# Patient Record
Sex: FEMALE | Race: OTHER | HISPANIC OR LATINO | URBAN - METROPOLITAN AREA
[De-identification: names, ages, dates, MRNs, and addresses within clinical notes are randomized per-mention and may not be internally consistent; named-entity substitution may affect disease eponyms.]

---

## 2019-12-21 ENCOUNTER — INPATIENT (INPATIENT)
Facility: HOSPITAL | Age: 74
LOS: 1 days | Discharge: HOME | DRG: 305 | End: 2019-12-23
Attending: INTERNAL MEDICINE | Admitting: INTERNAL MEDICINE
Payer: MEDICARE

## 2019-12-21 VITALS
TEMPERATURE: 98 F | RESPIRATION RATE: 20 BRPM | DIASTOLIC BLOOD PRESSURE: 101 MMHG | OXYGEN SATURATION: 97 % | HEART RATE: 104 BPM | SYSTOLIC BLOOD PRESSURE: 194 MMHG | HEIGHT: 63 IN | WEIGHT: 156.97 LBS

## 2019-12-21 PROCEDURE — 99285 EMERGENCY DEPT VISIT HI MDM: CPT

## 2019-12-21 PROCEDURE — 99053 MED SERV 10PM-8AM 24 HR FAC: CPT

## 2019-12-21 PROCEDURE — 93010 ELECTROCARDIOGRAM REPORT: CPT

## 2019-12-21 NOTE — ED ADULT TRIAGE NOTE - CHIEF COMPLAINT QUOTE
patient states that he blood pressure is high with HA and palpitations denies CP but feeling anxious

## 2019-12-22 DIAGNOSIS — I10 ESSENTIAL (PRIMARY) HYPERTENSION: ICD-10-CM

## 2019-12-22 DIAGNOSIS — R94.31 ABNORMAL ELECTROCARDIOGRAM [ECG] [EKG]: ICD-10-CM

## 2019-12-22 DIAGNOSIS — R00.2 PALPITATIONS: ICD-10-CM

## 2019-12-22 LAB
ALBUMIN SERPL ELPH-MCNC: 4.3 G/DL — SIGNIFICANT CHANGE UP (ref 3.3–5.2)
ALP SERPL-CCNC: 106 U/L — SIGNIFICANT CHANGE UP (ref 40–120)
ALT FLD-CCNC: 16 U/L — SIGNIFICANT CHANGE UP
ANION GAP SERPL CALC-SCNC: 14 MMOL/L — SIGNIFICANT CHANGE UP (ref 5–17)
APPEARANCE UR: CLEAR — SIGNIFICANT CHANGE UP
APTT BLD: 34.3 SEC — SIGNIFICANT CHANGE UP (ref 27.5–36.3)
AST SERPL-CCNC: 24 U/L — SIGNIFICANT CHANGE UP
BACTERIA # UR AUTO: NEGATIVE — SIGNIFICANT CHANGE UP
BILIRUB SERPL-MCNC: 0.3 MG/DL — LOW (ref 0.4–2)
BILIRUB UR-MCNC: NEGATIVE — SIGNIFICANT CHANGE UP
BUN SERPL-MCNC: 13 MG/DL — SIGNIFICANT CHANGE UP (ref 8–20)
CALCIUM SERPL-MCNC: 9.1 MG/DL — SIGNIFICANT CHANGE UP (ref 8.6–10.2)
CHLORIDE SERPL-SCNC: 105 MMOL/L — SIGNIFICANT CHANGE UP (ref 98–107)
CK MB CFR SERPL CALC: 2.1 NG/ML — SIGNIFICANT CHANGE UP (ref 0–6.7)
CK SERPL-CCNC: 125 U/L — SIGNIFICANT CHANGE UP (ref 25–170)
CK SERPL-CCNC: 157 U/L — SIGNIFICANT CHANGE UP (ref 25–170)
CK SERPL-CCNC: 166 U/L — SIGNIFICANT CHANGE UP (ref 25–170)
CO2 SERPL-SCNC: 23 MMOL/L — SIGNIFICANT CHANGE UP (ref 22–29)
COLOR SPEC: YELLOW — SIGNIFICANT CHANGE UP
CREAT SERPL-MCNC: 0.75 MG/DL — SIGNIFICANT CHANGE UP (ref 0.5–1.3)
DIFF PNL FLD: ABNORMAL
EPI CELLS # UR: SIGNIFICANT CHANGE UP
GLUCOSE SERPL-MCNC: 149 MG/DL — HIGH (ref 70–115)
GLUCOSE UR QL: NEGATIVE MG/DL — SIGNIFICANT CHANGE UP
HCT VFR BLD CALC: 42.8 % — SIGNIFICANT CHANGE UP (ref 34.5–45)
HGB BLD-MCNC: 13.2 G/DL — SIGNIFICANT CHANGE UP (ref 11.5–15.5)
INR BLD: 1.08 RATIO — SIGNIFICANT CHANGE UP (ref 0.88–1.16)
KETONES UR-MCNC: NEGATIVE — SIGNIFICANT CHANGE UP
LEUKOCYTE ESTERASE UR-ACNC: NEGATIVE — SIGNIFICANT CHANGE UP
LIDOCAIN IGE QN: 22 U/L — SIGNIFICANT CHANGE UP (ref 22–51)
MAGNESIUM SERPL-MCNC: 2.2 MG/DL — SIGNIFICANT CHANGE UP (ref 1.6–2.6)
MAGNESIUM SERPL-MCNC: 2.2 MG/DL — SIGNIFICANT CHANGE UP (ref 1.6–2.6)
MCHC RBC-ENTMCNC: 25.6 PG — LOW (ref 27–34)
MCHC RBC-ENTMCNC: 30.8 GM/DL — LOW (ref 32–36)
MCV RBC AUTO: 83.1 FL — SIGNIFICANT CHANGE UP (ref 80–100)
NITRITE UR-MCNC: NEGATIVE — SIGNIFICANT CHANGE UP
NT-PROBNP SERPL-SCNC: 50 PG/ML — SIGNIFICANT CHANGE UP (ref 0–300)
PH UR: 8 — SIGNIFICANT CHANGE UP (ref 5–8)
PHOSPHATE SERPL-MCNC: 2.2 MG/DL — LOW (ref 2.4–4.7)
PLATELET # BLD AUTO: 214 K/UL — SIGNIFICANT CHANGE UP (ref 150–400)
POTASSIUM SERPL-MCNC: 3.6 MMOL/L — SIGNIFICANT CHANGE UP (ref 3.5–5.3)
POTASSIUM SERPL-SCNC: 3.6 MMOL/L — SIGNIFICANT CHANGE UP (ref 3.5–5.3)
PROT SERPL-MCNC: 7.6 G/DL — SIGNIFICANT CHANGE UP (ref 6.6–8.7)
PROT UR-MCNC: 15 MG/DL
PROTHROM AB SERPL-ACNC: 12.5 SEC — SIGNIFICANT CHANGE UP (ref 10–12.9)
RBC # BLD: 5.15 M/UL — SIGNIFICANT CHANGE UP (ref 3.8–5.2)
RBC # FLD: 14.7 % — HIGH (ref 10.3–14.5)
RBC CASTS # UR COMP ASSIST: SIGNIFICANT CHANGE UP /HPF (ref 0–4)
SODIUM SERPL-SCNC: 142 MMOL/L — SIGNIFICANT CHANGE UP (ref 135–145)
SP GR SPEC: 1.01 — SIGNIFICANT CHANGE UP (ref 1.01–1.02)
TROPONIN T SERPL-MCNC: <0.01 NG/ML — SIGNIFICANT CHANGE UP (ref 0–0.06)
UROBILINOGEN FLD QL: NEGATIVE MG/DL — SIGNIFICANT CHANGE UP
WBC # BLD: 8.17 K/UL — SIGNIFICANT CHANGE UP (ref 3.8–10.5)
WBC # FLD AUTO: 8.17 K/UL — SIGNIFICANT CHANGE UP (ref 3.8–10.5)
WBC UR QL: SIGNIFICANT CHANGE UP

## 2019-12-22 PROCEDURE — 99223 1ST HOSP IP/OBS HIGH 75: CPT

## 2019-12-22 PROCEDURE — 93010 ELECTROCARDIOGRAM REPORT: CPT

## 2019-12-22 PROCEDURE — 71045 X-RAY EXAM CHEST 1 VIEW: CPT | Mod: 26

## 2019-12-22 RX ORDER — ACETAMINOPHEN 500 MG
650 TABLET ORAL EVERY 6 HOURS
Refills: 0 | Status: DISCONTINUED | OUTPATIENT
Start: 2019-12-22 | End: 2019-12-23

## 2019-12-22 RX ORDER — NITROGLYCERIN 6.5 MG
0.4 CAPSULE, EXTENDED RELEASE ORAL
Refills: 0 | Status: DISCONTINUED | OUTPATIENT
Start: 2019-12-22 | End: 2019-12-23

## 2019-12-22 RX ORDER — LOSARTAN POTASSIUM 100 MG/1
1 TABLET, FILM COATED ORAL
Qty: 0 | Refills: 0 | DISCHARGE

## 2019-12-22 RX ORDER — PANTOPRAZOLE SODIUM 20 MG/1
40 TABLET, DELAYED RELEASE ORAL
Refills: 0 | Status: DISCONTINUED | OUTPATIENT
Start: 2019-12-22 | End: 2019-12-23

## 2019-12-22 RX ORDER — ALPRAZOLAM 0.25 MG
0.5 TABLET ORAL DAILY
Refills: 0 | Status: DISCONTINUED | OUTPATIENT
Start: 2019-12-22 | End: 2019-12-23

## 2019-12-22 RX ORDER — LOSARTAN POTASSIUM 100 MG/1
50 TABLET, FILM COATED ORAL DAILY
Refills: 0 | Status: DISCONTINUED | OUTPATIENT
Start: 2019-12-22 | End: 2019-12-23

## 2019-12-22 RX ORDER — RANITIDINE HYDROCHLORIDE 150 MG/1
1 TABLET, FILM COATED ORAL
Qty: 0 | Refills: 0 | DISCHARGE

## 2019-12-22 RX ORDER — METOPROLOL TARTRATE 50 MG
5 TABLET ORAL ONCE
Refills: 0 | Status: COMPLETED | OUTPATIENT
Start: 2019-12-22 | End: 2019-12-22

## 2019-12-22 RX ADMIN — Medication 650 MILLIGRAM(S): at 23:08

## 2019-12-22 RX ADMIN — Medication 5 MILLIGRAM(S): at 02:53

## 2019-12-22 RX ADMIN — LOSARTAN POTASSIUM 50 MILLIGRAM(S): 100 TABLET, FILM COATED ORAL at 10:04

## 2019-12-22 RX ADMIN — PANTOPRAZOLE SODIUM 40 MILLIGRAM(S): 20 TABLET, DELAYED RELEASE ORAL at 12:28

## 2019-12-22 RX ADMIN — Medication 0.5 MILLIGRAM(S): at 10:04

## 2019-12-22 RX ADMIN — Medication 650 MILLIGRAM(S): at 10:04

## 2019-12-22 NOTE — ED ADULT NURSE NOTE - NSIMPLEMENTINTERV_GEN_ALL_ED
Implemented All Universal Safety Interventions:  [place] to call system. Call bell, personal items and telephone within reach. Instruct patient to call for assistance. Room bathroom lighting operational. Non-slip footwear when patient is off stretcher. Physically safe environment: no spills, clutter or unnecessary equipment. Stretcher in lowest position, wheels locked, appropriate side rails in place.

## 2019-12-22 NOTE — ED PROVIDER NOTE - OBJECTIVE STATEMENT
75 y/o female with PMHx of HTN, and Neuropathy, presents to ED, c/o hypertension. Patient states she was at her sisters house, her niece checked her blood pressure, found it to be high, called daughter and was brought to ED. Patients  in [place] says patient has emotional high blood pressure. Pt is feeling ok right now. Patient has [place] in the [place] now, has a bottle of 38 pills with her. Daughter says she believes patient is not taking the pills everyday.     Patient takes HTN medications everyday in the morning. States she currently has a headache, and has also been experiencing heartburn 1 hour after eating meals. Sometimes pt has palpitations, also has gained about 20 pounds recently. Daughter states patient's  passed away from Leukemia, and grandson was killed.         Denies chest pain, nausea, vomiting, stomach pain, fever, chills, SOB on exertion, 75 y/o female with PMHx of HTN, and Neuropathy, presents to ED, c/o hypertension. Patient states she was at her sisters house, her niece checked her blood pressure, found it to be high, called daughter and was brought to ED. Patients  in [place] says patient has emotional high blood pressure. Pt is feeling ok right now. Patient has been [place] in the [place] for 4 [place] now, has a bottle of 37 pills with her, that was a 90 day prescription. Daughter says she believes patient is not taking the pills everyday.     Patient takes HTN medications everyday in the morning. States she currently has a headache, and has also been experiencing heartburn 1 hour after eating meals. Sometimes pt has palpitations, also has gained about 20 pounds recently. Daughter states patient's  passed away from Leukemia, and grandson was killed.     Denies chest pain, nausea, vomiting, stomach pain, fever, chills, SOB on exertion, 75 y/o female with PMHx of HTN, and Neuropathy, presents to ED, c/o hypertension, headache, and palpitations which began earlier today.   Patient states that she was at her sisters house, when her niece checked her blood pressure, found it to be high, called pt's daughter, and had pt brought to the ED. Patients PCP in [place] told patient that she has emotional high blood pressure. Patient has been visiting the [place] for 4 [place] now, has a bottle of 37 pills with her, that was originally a 90 day prescription. Daughter believes patient has not been taking her pills everyday.   Patient is meant to be taking her HTN medications every morning. She also c/o newly onset intermittent heartburn, which occurs 1 hour consistently after eating. Pt has gained about 20 pounds recently. Daughter states patient's  passed away from Leukemia, and that her grandson was killed, and that these events were precursors to her diagnosis of hypertension. Denies chest pain, nausea, vomiting, stomach pain, fever, chills, SOB on exertion, dysuria, hematuria.

## 2019-12-22 NOTE — CONSULT NOTE ADULT - ASSESSMENT
73 y/o F with PMH HTN, Reflux, anxiety presents to ED with c/o high blood pressure. Daughter at bedside, pt was at sisters house, began feeling palpiatitons and headache, checked blood pressure was elevated, called daughter and came to ED. Pt non adherent with anti-hypertensives, takes 1/2 pill occasionally. Takes xanax 1mg "every other day", sometimes half pill. Also admits to epigastric pain, burning after eating, relieved with water, non radiating, lasting 4-5 minutes. States had endoscopy few years ago, no ulcers seen, told to change diet.

## 2019-12-22 NOTE — CONSULT NOTE ADULT - ATTENDING COMMENTS
Pt is seen, examined, chart reviewed, d/w NP/PA.    73 y/o F with PMH HTN, Reflux, anxiety presents to ED with c/o high blood pressure. Daughter at bedside, pt was at sisters house, began feeling palpitations and headache, checked blood pressure was elevated, called daughter and came to ED. Pt non adherent with anti-hypertensives, takes 1/2 pill occasionally. Takes xanax 1mg "every other day", sometimes half pill. Also admits to epigastric pain, burning after eating, relieved with water, non radiating, lasting 4-5 minutes. States had endoscopy few years ago, no ulcers seen, told to change diet.     Abnormal EKG.   Prolonged QT  Echo  NST in am  NPO after midnight    Hypertension, unspecified type   losartan 50mg daily     Palpitations  telemetry- SR, no ectopy, no arrythmia  continue to monitor.

## 2019-12-22 NOTE — ED ADULT NURSE NOTE - EXTENSIONS OF SELF_ADULT
I reviewed the H&P, I examined the patient, and the following change is made to the patient's condition:    Patient reports poor handgrip strength and inability to grasp objects. Muscle strength appears symmetric.        Roddy Ramirez MD  
None

## 2019-12-22 NOTE — H&P ADULT - ASSESSMENT
The patient is a 74 year old female with a history of hypertension, GERD and anxiety who presented to the Er with complaints of headache and palpitations. According to the patient she is visiting her daughters from [place] for the past 4 months and ran out of her medications. Yesterday she had symptoms of headache, palpitations and epigastric discomfort. She checked her blood pressure and it was elevated therefore presented to the Er for evaluation. In the Er, BP of 194/101mmHg with a a HR of 104. Improved with IV lopressor 5mg x 1. Cardiac enzymes were negative, EKG with prolonged QT interval    Assessment/Plan:    1. Chest pain/Epigastric pain: Rule out ACS  Echocardiogram  Serial enzymes negative  NST ordered  Continue aspirin  Check lipid panel    2. Hypertensive urgency: Resume losartan  Monitor BP    3. GERD Protonix po    4. Anxiety: xanax as needed  Monitor QT interval

## 2019-12-22 NOTE — H&P ADULT - HISTORY OF PRESENT ILLNESS
The patient is a 74 year old female with a history of hypertension, GERD and anxiety who presented to the Er with complaints of headache and palpitations. According to the patient she is visiting her daughters from [place] for the past 4 months and ran out of her medications. Yesterday she had symptoms of headache, palpitations and epigastric discomfort. She checked her blood pressure and it was elevated therefore presented to the Er for evaluation. In the Er, BP of 194/101mmHg with a a HR of 104. Improved with IV lopressor 5mg x 1. Cardiac enzymes were negative, EKG with prolonged QT interval. At the time of my examination, has complaints of headache.

## 2019-12-22 NOTE — CONSULT NOTE ADULT - SUBJECTIVE AND OBJECTIVE BOX
[place] [facility]                                                               Office:  [address] [address]                                                              Telephone: [phone]. Fax:[phone]                                                                        CARDIOLOGY CONSULTATION NOTE                                                                                             Consult requested by: Dr. Oh  Reason for Consultation: Abnl EKG  History obtained by: Patient and medical record   obtained: No    Chief complaint:    Patient is a 74y old  Female who presents with a chief complaint of High blood pressure     HPI: 73 y/o F with PMH HTN, Reflux, anxiety presents to ED with c/o high blood pressure. Daughter at bedside, pt was at sisters house, began feeling palpiatitons and headache, checked blood pressure was elevated, called daughter and came to ED. Pt non adherent with anti-hypertensives, takes 1/2 pill occasionally. Takes xanax 1mg "every other day", sometimes half pill. Also admits to epigastric pain, burning after eating, relieved with water, non radiating, lasting 4-5 minutes. States had endoscopy few years ago, no ulcers seen, told to change diet. EKG noted to have prolonged QT in ED. Denies fever, chills, cough, phlegm production, shortness of breath, dyspnea on exertion, orthopnea, PND, edema, chest pain, pressure, irregular, fast heart beat, nausea, vomiting, melena, rectal bleed, hematuria, lightheadedness, dizziness, syncope, near syncope.        REVIEW OF SYMPTOMS:   CONSTITUTIONAL: No fever, weight loss, or fatigue  ENMT:  No difficulty hearing, tinnitus, vertigo; No sinus or throat pain  NECK: No pain or stiffness  CARDIOVASCULAR: No chest pain, dyspnea, syncope, palpitations, dizziness, Orthopnea, Paroxsymal nocturnal dyspnea  RESPIRATORY: No Dyspnea on exertion, Shortness of breath, cough, wheezing  : No dysuria, no hematuria   GI: +epigastric pain; No dark color stool, no melena, no diarrhea, no constipation, no abdominal pain   NEURO: No headache, no dizziness, no slurred speech   MUSCULOSKELETAL: No joint pain or swelling; No muscle, back, or extremity pain  PSYCH: No agitation, no anxiety.    ALL OTHER REVIEW OF SYSTEMS ARE NEGATIVE.        ALLERGIES: Allergies  penicillin (Hives)  Intolerances        PAST MEDICAL HISTORY  HTN    PAST SURGICAL HISTORY      FAMILY HISTORY:  Denies significant family medical history    SOCIAL HISTORY:  Denies smoking/alcohol/drugs      CURRENT MEDICATIONS:       HOME MEDICATIONS:  losartan  xanax  zantac    Vital Signs Last 24 Hrs  T(C): 36.9 (22 Dec 2019 04:06), Max: 36.9 (22 Dec 2019 04:06)  T(F): 98.4 (22 Dec 2019 04:06), Max: 98.4 (22 Dec 2019 04:06)  HR: 80 (22 Dec 2019 07:37) (80 - 104)  BP: 125/77 (22 Dec 2019 07:37) (125/77 - 194/101)  RR: 20 (22 Dec 2019 07:37) (19 - 20)  SpO2: 99% (22 Dec 2019 07:37) (97% - 99%)      PHYSICAL EXAM:  Constitutional: Comfortable . No acute distress.   HEENT: Atraumatic and normocephalic , neck is supple . no JVD. No carotid bruit. PEERL   CNS: A&Ox3. No focal deficits. EOMI. Cranial nerves II-IX are intact.   Lymph Nodes: Cervical : Not palpable.  Respiratory: CTAB  Cardiovascular: S1S2 RRR. No murmur/rubs or gallop.  Gastrointestinal: Soft non-tender and non distended . +Bowel sounds. negative Marin's sign.  Extremities: No edema.   Psychiatric: Calm . no agitation.  Skin: No skin rash/ulcers visualized to face, hands or feet.        LABS:                        13.2   8.17  )-----------( 214      ( 22 Dec 2019 00:46 )             42.8     12-    142  |  105  |  13.0  ----------------------------<  149<H>  3.6   |  23.0  |  0.75    Ca    9.1      22 Dec 2019 00:46  Phos  2.2     12-  Mg     2.2     12-    TPro  7.6  /  Alb  4.3  /  TBili  0.3<L>  /  DBili  x   /  AST  24  /  ALT  16  /  AlkPhos  106  12-22    CARDIAC MARKERS ( 22 Dec 2019 06:48 )  x     / <0.01 ng/mL / 166 U/L / x     / 2.1 ng/mL  CARDIAC MARKERS ( 22 Dec 2019 00:46 )  x     / <0.01 ng/mL / x     / x     / x        ;p-BNP=Serum Pro-Brain Natriuretic Peptide: 50 pg/mL ( @ 00:46)  PT/INR - ( 22 Dec 2019 00:46 )   PT: 12.5 sec;   INR: 1.08 ratio    PTT - ( 22 Dec 2019 00:46 )  PTT:34.3 sec    Urinalysis Basic - ( 22 Dec 2019 04:11 )    Color: Yellow / Appearance: Clear / S.010 / pH: x  Gluc: x / Ketone: Negative  / Bili: Negative / Urobili: Negative mg/dL   Blood: x / Protein: 15 mg/dL / Nitrite: Negative   Leuk Esterase: Negative / RBC: 0-2 /HPF / WBC 0-2   Sq Epi: x / Non Sq Epi: Occasional / Bacteria: Negative        INTERPRETATION OF TELEMETRY: Reviewed by me.   ECG: Reviewed by me.     RADIOLOGY & ADDITIONAL STUDIES:    X-ray:  reviewed by me.   CT scan:   MRI:

## 2019-12-22 NOTE — ED PROVIDER NOTE - NS ED ROS FT
no weight change, no fever or chills  no recent travel, no recent abox, no sick contacts  no recent change in medications  no rash, no bruises  no visual changes no eye discharge  no cough cold or congestion,   no sob, no chest pain  follows with cardiology  no stress test, no cath  no orthopnea, no pnd  no abd pain, no n/v/d  no endoscopy, no colonoscopy  no hematuria, no change in urinary habits  no joint pain, no deformity  no headache, no paresthesia no weight change, no fever or chills  no recent travel, no recent abox, no sick contacts  Non compliant with HTN meds  no rash, no bruises  no visual changes no eye discharge  no cough cold or congestion,   no sob, no chest pain  no abd pain, no n/v/d  no hematuria, no change in urinary habits  no joint pain, no deformity  no headache, no paresthesia no weight change, no fever or chills  no recent abox, no sick contacts  Non compliant with HTN meds  no rash, no bruises  no visual changes no eye discharge  no cough cold or congestion,   no sob, no chest pain  + palpitations  no abd pain, no n/v/d  no hematuria, no change in urinary habits  no joint pain, no deformity  + headache, no paresthesia

## 2019-12-22 NOTE — ED PROVIDER NOTE - PHYSICAL EXAMINATION
Constitutional : Appears comfortably, talking in full sentences  Head :NC AT , no swelling  Eyes :eomi, no swelling  Mouth :mm moist,  Neck : supple, trachea in midline  Chest :Herb air entry, symm chest expansion, no distress  Heart :S1 S2 distant  Abdomen :abd soft, non tender  Musc/Skel :ext no swelling, no deformity, no spine tenderness, distal pulses present  Neuro  :AAO 3 no focal deficits Constitutional : Appears comfortably, talking softly in full sentences  Head :NC AT , no swelling  Eyes :eomi, no swelling  Mouth :mm moist,  Neck : supple, trachea in midline  Chest :Herb air entry, symm chest expansion, no distress  Heart :S1 S2 distant  Abdomen :abd soft, non tender  Musc/Skel :ext no swelling, no deformity, no spine tenderness, distal pulses present  Neuro  :AAO 3 no focal deficits

## 2019-12-22 NOTE — ED PROVIDER NOTE - CLINICAL SUMMARY MEDICAL DECISION MAKING FREE TEXT BOX
75 y/o female with Hx of HTN, non compliant with her medications. Pt has 37 pills in bottle of medications which was prescribed for 90 pills. Plan: Check serial troponin, Cardiology consult, Continue telemetry monitor and Re-eval. 75 y/o female with Hx of HTN, non compliant with her medications. Pt has 37 pills in a bottle of medications which was originally prescribed with 90 pills. Plan to check serial troponins, cardiology consult, continue telemetry monitoring and reevaluate. 75 y/o female with Hx of HTN, non compliant with her medications. Pt has 37 pills in a bottle of medications which was originally prescribed with 90 pills. Plan to check serial troponin, cardiology consult, continue telemetry monitoring and reevaluate.

## 2019-12-22 NOTE — CONSULT NOTE ADULT - PROBLEM SELECTOR RECOMMENDATION 9
- Prolonged QT  - non cardiac symptoms   - METS >4  - TTE pending  - Troponin neg x 2, CK/CKMB WNL  - Nuclear stress test tomorrow- NPO after midnight  - continue telemetry monitor

## 2019-12-22 NOTE — ED PROVIDER NOTE - CHPI ED SYMPTOMS NEG
no pain/no vomiting/no chills no shortness of breath/no vomiting/no syncope/no cough/no chills/no back pain/no chest pain

## 2019-12-23 VITALS — OXYGEN SATURATION: 100 % | RESPIRATION RATE: 18 BRPM | HEART RATE: 70 BPM

## 2019-12-23 LAB
ANION GAP SERPL CALC-SCNC: 16 MMOL/L — SIGNIFICANT CHANGE UP (ref 5–17)
BUN SERPL-MCNC: 20 MG/DL — SIGNIFICANT CHANGE UP (ref 8–20)
CALCIUM SERPL-MCNC: 9.1 MG/DL — SIGNIFICANT CHANGE UP (ref 8.6–10.2)
CHLORIDE SERPL-SCNC: 106 MMOL/L — SIGNIFICANT CHANGE UP (ref 98–107)
CHOLEST SERPL-MCNC: 184 MG/DL — SIGNIFICANT CHANGE UP (ref 110–199)
CO2 SERPL-SCNC: 22 MMOL/L — SIGNIFICANT CHANGE UP (ref 22–29)
CREAT SERPL-MCNC: 0.86 MG/DL — SIGNIFICANT CHANGE UP (ref 0.5–1.3)
GLUCOSE SERPL-MCNC: 92 MG/DL — SIGNIFICANT CHANGE UP (ref 70–115)
HCT VFR BLD CALC: 43.5 % — SIGNIFICANT CHANGE UP (ref 34.5–45)
HCV AB S/CO SERPL IA: 0.09 S/CO — SIGNIFICANT CHANGE UP (ref 0–0.99)
HCV AB SERPL-IMP: SIGNIFICANT CHANGE UP
HDLC SERPL-MCNC: 49 MG/DL — LOW
HGB BLD-MCNC: 13.8 G/DL — SIGNIFICANT CHANGE UP (ref 11.5–15.5)
LIPID PNL WITH DIRECT LDL SERPL: 119 MG/DL — SIGNIFICANT CHANGE UP
MCHC RBC-ENTMCNC: 26 PG — LOW (ref 27–34)
MCHC RBC-ENTMCNC: 31.7 GM/DL — LOW (ref 32–36)
MCV RBC AUTO: 82.1 FL — SIGNIFICANT CHANGE UP (ref 80–100)
PLATELET # BLD AUTO: 215 K/UL — SIGNIFICANT CHANGE UP (ref 150–400)
POTASSIUM SERPL-MCNC: 3.5 MMOL/L — SIGNIFICANT CHANGE UP (ref 3.5–5.3)
POTASSIUM SERPL-SCNC: 3.5 MMOL/L — SIGNIFICANT CHANGE UP (ref 3.5–5.3)
RBC # BLD: 5.3 M/UL — HIGH (ref 3.8–5.2)
RBC # FLD: 15 % — HIGH (ref 10.3–14.5)
SODIUM SERPL-SCNC: 144 MMOL/L — SIGNIFICANT CHANGE UP (ref 135–145)
TOTAL CHOLESTEROL/HDL RATIO MEASUREMENT: 4 RATIO — SIGNIFICANT CHANGE UP (ref 3.3–7.1)
TRIGL SERPL-MCNC: 78 MG/DL — SIGNIFICANT CHANGE UP (ref 10–200)
WBC # BLD: 7.42 K/UL — SIGNIFICANT CHANGE UP (ref 3.8–10.5)
WBC # FLD AUTO: 7.42 K/UL — SIGNIFICANT CHANGE UP (ref 3.8–10.5)

## 2019-12-23 PROCEDURE — 78452 HT MUSCLE IMAGE SPECT MULT: CPT | Mod: 26

## 2019-12-23 PROCEDURE — 99238 HOSP IP/OBS DSCHRG MGMT 30/<: CPT

## 2019-12-23 PROCEDURE — 93016 CV STRESS TEST SUPVJ ONLY: CPT

## 2019-12-23 PROCEDURE — 99232 SBSQ HOSP IP/OBS MODERATE 35: CPT

## 2019-12-23 PROCEDURE — 93018 CV STRESS TEST I&R ONLY: CPT

## 2019-12-23 RX ORDER — LOSARTAN POTASSIUM 100 MG/1
100 TABLET, FILM COATED ORAL DAILY
Refills: 0 | Status: DISCONTINUED | OUTPATIENT
Start: 2019-12-23 | End: 2019-12-23

## 2019-12-23 RX ORDER — ALPRAZOLAM 0.25 MG
1 TABLET ORAL
Qty: 0 | Refills: 0 | DISCHARGE

## 2019-12-23 RX ADMIN — LOSARTAN POTASSIUM 50 MILLIGRAM(S): 100 TABLET, FILM COATED ORAL at 05:49

## 2019-12-23 RX ADMIN — Medication 0.5 MILLIGRAM(S): at 00:05

## 2019-12-23 RX ADMIN — PANTOPRAZOLE SODIUM 40 MILLIGRAM(S): 20 TABLET, DELAYED RELEASE ORAL at 05:49

## 2019-12-23 RX ADMIN — Medication 650 MILLIGRAM(S): at 00:07

## 2019-12-23 NOTE — PROGRESS NOTE ADULT - ASSESSMENT
75 y/o F with PMH HTN, Reflux, anxiety presents to ED with c/o high blood pressure. Daughter at bedside, pt was at sisters house, began feeling palpitations and headache, checked blood pressure was elevated, called daughter and came to ED. Pt non adherent with anti-hypertensives, takes 1/2 pill occasionally. Takes xanax 1mg "every other day", sometimes half pill. Also admits to epigastric pain, burning after eating, relieved with water, non radiating, lasting 4-5 minutes.     Abnormal EKG.   Echo as above  NST today: If normal outpt cardiology FU     Hypertension, unspecified type  Monitor BP  Cont meds, advance as needed

## 2019-12-23 NOTE — DISCHARGE NOTE PROVIDER - CARE PROVIDER_API CALL
[facility],   [facility], [place] [address] [phone]  Phone: (   )    -  Fax: (   )    -  Follow Up Time:

## 2019-12-23 NOTE — DISCHARGE NOTE PROVIDER - NSDCMRMEDTOKEN_GEN_ALL_CORE_FT
losartan 100 mg oral tablet: 1 tab(s) orally once a day  Xanax 0.5 mg oral tablet: 1 tab(s) orally once a day, As Needed  Zantac 150 oral tablet: 1 tab(s) orally 2 times a day losartan 100 mg oral tablet: 1 tab(s) orally once a day  Zantac 150 oral tablet: 1 tab(s) orally 2 times a day

## 2019-12-23 NOTE — DISCHARGE NOTE NURSING/CASE MANAGEMENT/SOCIAL WORK - PATIENT PORTAL LINK FT
You can access the FollowMyHealth Patient Portal offered by [facility] by registering at the following website: [email or web address]. By joining [employer]’s FollowMyHealth portal, you will also be able to view your health information using other applications (apps) compatible with our system.

## 2019-12-23 NOTE — DISCHARGE NOTE PROVIDER - PROVIDER TOKENS
FREE:[LAST:[[facility] clinic],PHONE:[(   )    -],FAX:[(   )    -],ADDRESS:[[facility] [address], [place] [phone]]]

## 2019-12-23 NOTE — PROGRESS NOTE ADULT - SUBJECTIVE AND OBJECTIVE BOX
CARDIOLOGY PROGRESS NOTE   ([facility])                                                                                                        Subjective: no new c/o, nad, denied dyspnea, cp    Vitals:  T(C): 36.4 (19 @ 05:45), Max: 36.8 (19 @ 21:26)  HR: 75 (19 @ 08:00) (75 - 83)  BP: 148/78 (19 @ 08:00) (147/84 - 170/86)  RR: 18 (19 @ 08:00) (17 - 18)  SpO2: 95% (19 @ 08:00) (93% - 97%)  Wt(kg): --  I&O's Summary    Height (cm): 157.5 ( @ 23:00)  Weight (kg): 68.8 ( @ 23:00)  BMI (kg/m2): 27.7 ( @ 23:00)  BSA (m2): 1.7 (:00)    PHYSICAL EXAM:  Appearance: Normal	  HEENT:   Atraumatic  Cardiovascular: Normal S1 S2, No JVD, No murmurs, No edema  Respiratory: Lungs clear to auscultation	  Gastrointestinal:  Soft, Non-tender, + BS	  Skin: No rashes, No ecchymoses, No cyanosis  Neurologic: Alert and awake, able to move extremities  Extremities: No edema        CURRENT MEDICATIONS:  losartan 100 milliGRAM(s) Oral daily  nitroglycerin     SubLingual 0.4 milliGRAM(s) SubLingual every 5 minutes PRN        acetaminophen   Tablet .. 650 milliGRAM(s) Oral every 6 hours PRN  ALPRAZolam 0.5 milliGRAM(s) Oral daily PRN    pantoprazole    Tablet 40 milliGRAM(s) Oral before breakfast          LABS:	 	                            13.8   7.42  )-----------( 215      ( 23 Dec 2019 06:44 )             43.5         144  |  106  |  20.0  ----------------------------<  92  3.5   |  22.0  |  0.86    Ca    9.1      23 Dec 2019 06:44  Phos  2.2       Mg     2.2         TPro  7.6  /  Alb  4.3  /  TBili  0.3<L>  /  DBili  x   /  AST  24  /  ALT  16  /  AlkPhos  106      proBNP: Serum Pro-Brain Natriuretic Peptide: 50 pg/mL ( @ 00:46)    Lipid Profile: Date:  @ 06:44  Total cholesterol 184; Direct LDL: 119; HDL: 49; Triglycerides:78        EXAM:  ECHO TRANSTHORACIC COMP W DOPP      PROCEDURE DATE:  Dec 22 2019   .      INTERPRETATION:  REPORT:    TRANSTHORACIC ECHOCARDIOGRAM REPORT         Patient Name:   CANDELARIO NICHOLS Patient Location: [facility] Rec #:  [id number]                 Accession #:      [id number]  Account #:                               Height:           63.0 in 160.0 cm  YOB: 1945                Weight:           156.5 lb 71.00 kg  Patient Age:    74 years                 BSA:              1.74 m²  Patient Gender: F                        BP:               125/77 mmHg       Date of Exam:        2019 11:30:40 AM  Sonographer:         Damir Zacarias Jr  Referring Physician: Catherine SERRANO    Procedure:   2D Echo/Doppler/Color Doppler Complete.  Indications: Abnormal electrocardiogram [ECG] [EKG] - R94.31  Diagnosis:   Nonrheumatic mitral (valve) insufficiency - I34.0         2D AND M-MODE MEASUREMENTS (normal ranges within parentheses):  Left                 Normal   Aorta/Left            Normal  Ventricle:                    Atrium:  IVSd (2D):    1.14  (0.7-1.1) Aortic Root  3.10 cm (2.4-3.7)                 cm             (2D):  LVPWd (2D):   0.86  (0.7-1.1) Left Atrium  2.77 cm (1.9-4.0)                 cm             (2D):  LVIDd (2D):   4.53  (3.4-5.7) LA Volume     16.8                 cm             Index         ml/m²  LVIDs (2D):   2.72            Right Ventricle:                 cm             TAPSE:           1.70 cm  LV FS (2D):   40.0   (>25%)                  %  Relative Wall 0.38   (<0.42)  Thickness    LV SYSTOLIC FUNCTION BY 2D PLANIMETRY (MOD):  EF-Biplane: 62 %    LV DIASTOLIC FUNCTION:  MV Peak E: 0.60 m/s E/e' Ratio: 8.90  MV Peak A: 0.79 m/s Decel Time: 208 msec  E/A Ratio: 0.75    SPECTRAL DOPPLER ANALYSIS (where applicable):  Mitral Valve:  MV P1/2 Time: 60.32 msec  MV Area, PHT: 3.65 cm²    Aortic Valve: AoV Max Marques:  AoV Peak PG:  AoV Mean P.0 mmHg    LVOT Vmax:  LVOT VTI: 0.199 m LVOT Diameter: 2.13 cm    AoV Area, Vmax:  AoV Area, VTI: 2.48 cm² AoV Area, Vmn: 2.70 cm²  Ao VTI: 0.286  Tricuspid Valve and PA/RV Systolic Pressure: TR Max Velocity: 1.09 m/s RA Pressure: 3 mmHg RVSP/PASP: 7.8 mmHg       PHYSICIAN INTERPRETATION:  Left Ventricle: The left ventricular internal cavity size is normal.  Left ventricular ejection fraction, by visual estimation, is 60 to 65%. Spectral Doppler shows impaired relaxation pattern of left ventricular myocardial filling (Grade I diastolic dysfunction).  Right Ventricle: Normal right ventricular size and function. TV S' 0.2 m/s.  Left Atrium: The left atrium is normal in size.  Right Atrium: The right atrium is normal in size.  Pericardium: There is no evidence of pericardial effusion.  Mitral Valve: Trace mitral valve regurgitation is seen.  Tricuspid Valve: Trivial tricuspid regurgitation is visualized.  Aortic Valve: No evidence of aortic valve regurgitation is seen.  Pulmonic Valve: Mild pulmonic valve regurgitation.  Aorta: The aortic root is normal in size and structure.  Pulmonary Artery: The pulmonary artery is not well seen.  Venous: The inferior vena cava was normal sized, with respiratory size variation greater than 50%.       Summary:   1. Left ventricular ejection fraction, by visual estimation, is 60 to 65%.   2. Spectral Doppler shows impaired relaxation pattern of left ventricular myocardial filling (Grade I diastolic dysfunction).   3. There is mild septal left ventricular hypertrophy.    MD Mady Electronically signed on 2019 at 4:06:35 PM

## 2019-12-23 NOTE — DISCHARGE NOTE PROVIDER - NSDCCPCAREPLAN_GEN_ALL_CORE_FT
PRINCIPAL DISCHARGE DIAGNOSIS  Diagnosis: Hypertensive urgency  Assessment and Plan of Treatment: COntinue Po losartan as prescribed  Monitor blood pressure        SECONDARY DISCHARGE DIAGNOSES  Diagnosis: Chest pain  Assessment and Plan of Treatment: Non- cardiac in nature  Monitor for recurrent symptoms

## 2020-01-10 PROCEDURE — 86803 HEPATITIS C AB TEST: CPT

## 2020-01-10 PROCEDURE — 96374 THER/PROPH/DIAG INJ IV PUSH: CPT

## 2020-01-10 PROCEDURE — 85610 PROTHROMBIN TIME: CPT

## 2020-01-10 PROCEDURE — 82550 ASSAY OF CK (CPK): CPT

## 2020-01-10 PROCEDURE — 93017 CV STRESS TEST TRACING ONLY: CPT

## 2020-01-10 PROCEDURE — 93005 ELECTROCARDIOGRAM TRACING: CPT

## 2020-01-10 PROCEDURE — 83690 ASSAY OF LIPASE: CPT

## 2020-01-10 PROCEDURE — 36415 COLL VENOUS BLD VENIPUNCTURE: CPT

## 2020-01-10 PROCEDURE — 83735 ASSAY OF MAGNESIUM: CPT

## 2020-01-10 PROCEDURE — 93306 TTE W/DOPPLER COMPLETE: CPT

## 2020-01-10 PROCEDURE — 85027 COMPLETE CBC AUTOMATED: CPT

## 2020-01-10 PROCEDURE — 71045 X-RAY EXAM CHEST 1 VIEW: CPT

## 2020-01-10 PROCEDURE — 84484 ASSAY OF TROPONIN QUANT: CPT

## 2020-01-10 PROCEDURE — 78452 HT MUSCLE IMAGE SPECT MULT: CPT

## 2020-01-10 PROCEDURE — 80053 COMPREHEN METABOLIC PANEL: CPT

## 2020-01-10 PROCEDURE — 84100 ASSAY OF PHOSPHORUS: CPT

## 2020-01-10 PROCEDURE — 81001 URINALYSIS AUTO W/SCOPE: CPT

## 2020-01-10 PROCEDURE — 80048 BASIC METABOLIC PNL TOTAL CA: CPT

## 2020-01-10 PROCEDURE — 80061 LIPID PANEL: CPT

## 2020-01-10 PROCEDURE — 82553 CREATINE MB FRACTION: CPT

## 2020-01-10 PROCEDURE — 85730 THROMBOPLASTIN TIME PARTIAL: CPT

## 2020-01-10 PROCEDURE — 83880 ASSAY OF NATRIURETIC PEPTIDE: CPT

## 2020-01-10 PROCEDURE — 99285 EMERGENCY DEPT VISIT HI MDM: CPT | Mod: 25

## 2020-01-10 PROCEDURE — A9500: CPT

## 2021-05-21 ENCOUNTER — EMERGENCY (EMERGENCY)
Facility: HOSPITAL | Age: 76
LOS: 1 days | Discharge: HOME | End: 2021-05-21
Attending: EMERGENCY MEDICINE
Payer: MEDICARE

## 2021-05-21 VITALS — SYSTOLIC BLOOD PRESSURE: 182 MMHG | DIASTOLIC BLOOD PRESSURE: 90 MMHG

## 2021-05-21 VITALS
SYSTOLIC BLOOD PRESSURE: 197 MMHG | HEIGHT: 62 IN | RESPIRATION RATE: 18 BRPM | WEIGHT: 145.06 LBS | OXYGEN SATURATION: 97 % | HEART RATE: 75 BPM | TEMPERATURE: 98 F | DIASTOLIC BLOOD PRESSURE: 104 MMHG

## 2021-05-21 PROCEDURE — 70450 CT HEAD/BRAIN W/O DYE: CPT | Mod: 26

## 2021-05-21 PROCEDURE — 99284 EMERGENCY DEPT VISIT MOD MDM: CPT

## 2021-05-21 PROCEDURE — 70450 CT HEAD/BRAIN W/O DYE: CPT

## 2021-05-21 PROCEDURE — 99284 EMERGENCY DEPT VISIT MOD MDM: CPT | Mod: 25

## 2021-05-21 RX ORDER — AMLODIPINE BESYLATE 2.5 MG/1
1 TABLET ORAL
Qty: 28 | Refills: 0
Start: 2021-05-21 | End: 2021-06-17

## 2021-05-21 RX ORDER — AMLODIPINE BESYLATE 2.5 MG/1
5 TABLET ORAL ONCE
Refills: 0 | Status: COMPLETED | OUTPATIENT
Start: 2021-05-21 | End: 2021-05-21

## 2021-05-21 RX ORDER — ALPRAZOLAM 0.25 MG
0.25 TABLET ORAL ONCE
Refills: 0 | Status: DISCONTINUED | OUTPATIENT
Start: 2021-05-21 | End: 2021-05-21

## 2021-05-21 RX ORDER — ACETAMINOPHEN 500 MG
975 TABLET ORAL ONCE
Refills: 0 | Status: COMPLETED | OUTPATIENT
Start: 2021-05-21 | End: 2021-05-21

## 2021-05-21 RX ADMIN — Medication 975 MILLIGRAM(S): at 18:01

## 2021-05-21 RX ADMIN — AMLODIPINE BESYLATE 5 MILLIGRAM(S): 2.5 TABLET ORAL at 18:01

## 2021-05-21 RX ADMIN — Medication 0.25 MILLIGRAM(S): at 18:01

## 2021-05-21 NOTE — ED ADULT NURSE REASSESSMENT NOTE - NS ED NURSE REASSESS COMMENT FT1
Assumed care of patient from previous RN.  Patient c/o "little bit of a headache", otherwise feels better.  Patient repeat /88.  Patient pending discharge papers at this time.  Patients family present at bedside.  Safety maintained.

## 2021-05-21 NOTE — ED PROVIDER NOTE - CLINICAL SUMMARY MEDICAL DECISION MAKING FREE TEXT BOX
Patient presents with HTN and headache.  CT scan demonstrates no acute pathology. Patient with improvement of BP to 180s/90.  Has f/u at home in [place].  will increase amlodipine to 5mg and will return for worsening sx.  On exit reassessment, patient ate at [employer] last night and normally is very focused on avoiding salt.  discussion of dietary causes of HTN with good teachback.  Non toxic.  Well appearing. Uneventful ED observation period. Patient given prescription medications for their condition and advised to take them as prescribed and check with their Primary Care Provider if any questions arise. Discussed results and outcome of testing with the patient.  Patient advised to please follow up with their primary care doctor within the next 24 hours and return to the Emergency Department for worsening symptoms or any other concerns.  Patient advised that their doctor may call  to follow up on the specific results of the tests performed today in the emergency department.

## 2021-05-21 NOTE — ED ADULT TRIAGE NOTE - CHIEF COMPLAINT QUOTE
Pt states she went to urgent care for COVID test for travel this AM and was told to come to ED because BP was high.  Pt denies any dizziness, c/o mild headache.

## 2021-05-21 NOTE — ED PROVIDER NOTE - PATIENT PORTAL LINK FT
You can access the FollowMyHealth Patient Portal offered by [facility] by registering at the following website: [email or web address]. By joining [employer]’s FollowMyHealth portal, you will also be able to view your health information using other applications (apps) compatible with our system.

## 2021-05-21 NOTE — ED ADULT NURSE NOTE - OBJECTIVE STATEMENT
pt awake, alert and oriented x3 sent in from  for high blood pressure.  pt states he went to  for routine covid swab for travel, was told her BP was elevated and was referred to ED.  pt denies chest pain or shortness of breath but notes headache x3 days.  resp even and unlabored.  pt states she is prescribed amlodipine but has not been taking it.

## 2021-05-21 NOTE — ED PROVIDER NOTE - OBJECTIVE STATEMENT
Pertinent PMH/PSH/FHx/SHx and Review of Systems contained within:  Patient presents to the ED for HTN and headache.   present.  PMH of HTN, HLD, anxiety.  Daughter bedside.  Mild headache for 3 days without any other concerns.  Patient went for COVID testing today in preparation to fly back home to [place] and was discovered to have 190s/100s and sent for evaluation.  In ED, BP is similar.  patient took her metoprolol but not her amlodipine today.  Patient's cards in [place] had increased her meds but she forgot to bring the higher doses with her.  no other concerns.  Non toxic.  Well appearing. No aggravating or relieving factors. No other pertinent PMH.  No other pertinent PSH.  No other pertinent FHx.  Patient denies EtOH/tobacco/illicit substance use. No fever/chills, No photophobia/eye pain/changes in vision, No ear pain/sore throat/dysphagia, No chest pain/palpitations, no SOB/cough/wheeze/stridor, No abdominal pain, No N/V/D, no dysuria/frequency/discharge, No neck/back pain, no rash, no changes in neurological status/function.

## 2021-05-22 PROBLEM — I10 ESSENTIAL (PRIMARY) HYPERTENSION: Chronic | Status: ACTIVE | Noted: 2019-12-22

## 2021-05-22 PROBLEM — K21.9 GASTRO-ESOPHAGEAL REFLUX DISEASE WITHOUT ESOPHAGITIS: Chronic | Status: ACTIVE | Noted: 2019-12-22

## 2021-05-22 PROBLEM — F41.9 ANXIETY DISORDER, UNSPECIFIED: Chronic | Status: ACTIVE | Noted: 2019-12-22

## 2021-06-25 NOTE — ED ADULT NURSE NOTE - ABDOMEN
Spoke with daughter Lynn Yin who states patient is supposed to give a clean catch urine sample 48 hours after completing antibiotic. Daughter has not be able to obtain a clean catch. However she does feel like patient is improving.  Also patient has an appoint
WANTS TO TALK TO NURSE ABOUT HER UTI
noted
soft/nondistended

## 2022-05-17 NOTE — PATIENT PROFILE ADULT - STATED REASON FOR ADMISSION
I was having chest pain
Detail Level: Zone
Initiate Treatment: Betamethasone 0.05% cream bid until clear

## 2023-01-09 NOTE — PATIENT PROFILE ADULT - LAST ORAL INTAKE
22-Dec-2019 13:15 Clindamycin Pregnancy And Lactation Text: This medication can be used in pregnancy if certain situations. Clindamycin is also present in breast milk.

## 2023-04-14 NOTE — PATIENT PROFILE ADULT - NSASFUNCLEVELADLTRANSFER_GEN_A_NUR
Received referral from consult   for DC planning referral reason(s). 64 year old admitted 4/13/2023 as Outpatient in Bed with a diagnosis of tear of right acetabular labrum.  Prior to admission patient was living with Spouse and  residing at House.  Patient  does  have a Power of  for Healthcare.  Document is not activated.  Agent is #1 spouse - Daniel and #2 son - Frederick.Patient’s Primary Care Provider is Angela Zurita MD. Pt expected to DC today once medically stable. Pt expected to DC home with no anticipated needs.     SW/CM to follow.    Sylwia Srinivasan MSW, APSW  Medical Social Worker     0 = independent

## 2025-01-21 NOTE — ED ADULT TRIAGE NOTE - HEIGHT IN INCHES
Department of Anesthesiology  Preprocedure Note       Name:  Savannah Pace   Age:  50 y.o.  :  1974                                          MRN:  [id number]         Date:  2025      Surgeon: Surgeon(s):  Demarco Mustafa MD    Procedure: Procedure(s):  TRANSANAL EXCISION OF RECTAL POLYP    Medications prior to admission:   Prior to Admission medications    Medication Sig Start Date End Date Taking? Authorizing Provider   Saccharomyces boulardii (PROBIOTIC) 250 MG CAPS Take by mouth   Yes ProviderAndrea MD   fexofenadine (ALLEGRA) 180 MG tablet Take 1 tablet by mouth every morning 17  Yes Automatic Reconciliation, Ar   levonorgestrel-ethinyl estradiol (AVIANE;ALESSE;LESSINA) 0.1-20 MG-MCG per tablet Take 1 tablet by mouth daily 22  Yes Automatic Reconciliation, Ar   mesalamine (DELZICOL) 800 MG TBEC TBEC tablet Take 1 tablet by mouth every morning 11/30/15  Yes Automatic Reconciliation, Ar   EPINEPHrine (EPIPEN) 0.3 MG/0.3ML SOAJ injection INJECT INTRAMUSCULARLY AS DIRECTED 19   ProviderAndrea MD   levocetirizine (XYZAL) 5 MG tablet Take 1 tablet by mouth as needed 22   ProviderAndrea MD       Current medications:    Current Facility-Administered Medications   Medication Dose Route Frequency Provider Last Rate Last Admin    lidocaine PF 1 % injection 1 mL  1 mL IntraDERmal Once PRN Amy Sr MD        fentaNYL (SUBLIMAZE) injection 100 mcg  100 mcg IntraVENous Once PRN Amy Sr MD        lactated ringers infusion   IntraVENous Continuous Amy Sr MD        sodium chloride flush 0.9 % injection 5-40 mL  5-40 mL IntraVENous 2 times per day Amy Sr MD        sodium chloride flush 0.9 % injection 5-40 mL  5-40 mL IntraVENous PRN Amy Sr MD        0.9 % sodium chloride infusion   IntraVENous PRN Amy rS MD        midazolam PF (VERSED) injection 2 mg  2 mg IntraVENous PRN Remberto 
3